# Patient Record
Sex: MALE
[De-identification: names, ages, dates, MRNs, and addresses within clinical notes are randomized per-mention and may not be internally consistent; named-entity substitution may affect disease eponyms.]

---

## 2021-02-05 ENCOUNTER — NURSE TRIAGE (OUTPATIENT)
Dept: OTHER | Facility: CLINIC | Age: 51
End: 2021-02-05

## 2021-02-06 NOTE — TELEPHONE ENCOUNTER
Reason for Disposition   Difficulty breathing    Answer Assessment - Initial Assessment Questions  1. TEMPERATURE: \"What is the most recent temperature? \"  \"How was it measured? \"         102.0, orally    2. ONSET: \"When did the fever start? \"       9 days ago    3. SYMPTOMS: \"Do you have any other symptoms besides the fever? \"  (e.g., colds, headache, sore throat, earache, cough, rash, diarrhea, vomiting, abdominal pain)      Fever, chills, fatigue, cough, sore throat, cough,    4. CAUSE: If there are no symptoms, ask: \"What do you think is causing the fever? \"        COVID    5. CONTACTS: \"Does anyone else in the family have an infection? \"      Yes, COVID    6. TREATMENT: \"What have you done so far to treat this fever? \" (e.g., medications)       Robitussin and Tylenol    7. IMMUNOCOMPROMISE: \"Do you have of the following: diabetes, HIV positive, splenectomy, cancer chemotherapy, chronic steroid treatment, transplant patient, etc.\"       Sleep apnea        Caller stated that he developed a temperature 9 days ago and has been taking fever reduction medication that has not helped. Fever, chills, fatigue, cough, sore throat, coughing up blood. Caller was informed be evaluated at the ED tonight with shortness of breath and to call back for additional questions and agreed. Protocols used:  [facility]